# Patient Record
Sex: MALE | NOT HISPANIC OR LATINO | Employment: OTHER | URBAN - NONMETROPOLITAN AREA
[De-identification: names, ages, dates, MRNs, and addresses within clinical notes are randomized per-mention and may not be internally consistent; named-entity substitution may affect disease eponyms.]

---

## 2019-10-15 ENCOUNTER — HOSPITAL ENCOUNTER (OUTPATIENT)
Facility: HOSPITAL | Age: 69
Setting detail: HOSPITAL OUTPATIENT SURGERY
End: 2019-10-15
Attending: INTERNAL MEDICINE | Admitting: INTERNAL MEDICINE

## 2022-03-08 ENCOUNTER — OFFICE VISIT (OUTPATIENT)
Dept: SLEEP MEDICINE | Facility: HOSPITAL | Age: 72
End: 2022-03-08

## 2022-03-08 VITALS
HEART RATE: 66 BPM | HEIGHT: 67 IN | WEIGHT: 190 LBS | BODY MASS INDEX: 29.82 KG/M2 | DIASTOLIC BLOOD PRESSURE: 87 MMHG | SYSTOLIC BLOOD PRESSURE: 143 MMHG | OXYGEN SATURATION: 94 %

## 2022-03-08 DIAGNOSIS — R06.83 SNORING: Primary | ICD-10-CM

## 2022-03-08 DIAGNOSIS — G47.00 FREQUENT NOCTURNAL AWAKENING: ICD-10-CM

## 2022-03-08 DIAGNOSIS — G47.19 EXCESSIVE DAYTIME SLEEPINESS: ICD-10-CM

## 2022-03-08 PROCEDURE — 99203 OFFICE O/P NEW LOW 30 MIN: CPT | Performed by: NURSE PRACTITIONER

## 2022-03-08 NOTE — PROGRESS NOTES
New Patient Sleep Medicine Consultation    Encounter Date: 3/8/2022         Patient's Primary Care Provider: Provider, No Known  Referring Provider: No ref. provider found  Reason for consultation/chief complaint: snoring, awakening gasping for breath, witnessed apneas, excessive daytime sleepiness and unrefreshing sleep    Tano Brady is a 71 y.o. male who admits to snoring, unrestful sleep, high blood pressure, gasping during sleep, decreased libido, excessive daytime sleepiness, stopping breathing during sleep, frequent unexplained arousals, sleeping less than 6 hours per night, disturbed or restless sleep, up to bathroom at night, sleepy driving, restless legs at night, difficulty staying asleep and taking medicine to help go to sleep.    He denies cataplexy, sleep paralysis, or hypnagogic hallucinations. His bedtime is ~ 6134-1733. He  falls asleep after 5-10 minutes, and is up 4-5 times per night. He wakes up ~ 0500. He endorses 3-4 hours of sleep. He drinks 3-4 cups of coffee, 0 teas, and 0 sodas per day. He drinks 0 alcoholic beverages per week. He is not a current smoker. He does not take sedatives or hypnotics. He has no sleepiness with driving. He naps most days when unstimulated.   Patient was previously on CPAP for at least 5 years, using a full face mask with an autoCPAP, and stopped using his machine due to pressure intolerance. He states that the pressure ramped up too high at night and would blow out of the sides of his mask.    *[facility] is a [place] [facility] if he requalifies for [facility]AP therapy.    Maria Stein - 17    Prior Sleep Testin. HST >10 years ago, unsure of AHI  2. Currently not on therapy for 3-4 years      Past Medical History:   Diagnosis Date   • Hypertension       *Takes amlodipine, HCTZ, and daily antihistamine but unsure of doses    Social History     Socioeconomic History   • Marital status: Unknown   Tobacco Use   • Smoking status: Former Smoker      "Packs/day: 3.00     Years: 22.00     Pack years: 66.00     Types: Cigarettes     Family History   Problem Relation Age of Onset   • Cancer Mother    • Cancer Father      Prior T&A, UPPP, maxillofacial, or bariatric surgery: Nasal septoplasty, sinus surgery  Family history of sleep disorders: None  Other family history + for: None  Occupation: Former , semi-retired from [employer] work  Marital status:   Children: 5  Has 6 brothers and 5 sisters  Smoking history: smoked 3 ppds from age 16 until 38      Review of Systems:  Constitutional: positive for fatigue  Eyes: negative  Ears, nose, mouth, throat, and face: positive for snoring  Respiratory: negative  Cardiovascular: negative  Gastrointestinal: negative  Genitourinary:negative  Integument/breast: negative  Hematologic/lymphatic: negative  Musculoskeletal:negative  Neurological: negative  Behavioral/Psych: positive for fatigue and sleep disturbance  Endocrine: negative  Allergic/Immunologic: negative   Patient advised to discuss any positive ROS with PCP.      Vitals:    03/08/22 1435   BP: 143/87   Pulse: 66   SpO2: 94%           03/08/22  1435   Weight: 86.2 kg (190 lb)       Body mass index is 30.21 kg/m². Patient's Body mass index is 30.21 kg/m². indicating that he is obese (BMI >30). Obesity-related health conditions include the following: obstructive sleep apnea and hypertension. Obesity is unchanged. BMI is is above average; BMI management plan is completed. We discussed portion control and increasing exercise..      Tobacco Use: Medium Risk   • Smoking Tobacco Use: Former Smoker   • Smokeless Tobacco Use: Unknown       Physical Exam:        General: Alert. Cooperative. Well developed. No acute distress.   Head/Neck:  Normocephalic. Symmetrical. Atraumatic.     Neck circumference: 18\"             Eyes: Sclera clear. No icterus. PERRLA. Normal EOM.             Ears: No deformities. Normal hearing.             Nose: No septal deviation. No " drainage.          Throat: No oral lesions. No thrush. Moist mucous membranes. Trachea midline    Tongue is normal     Dentition is fair       Pharynx: Posterior pharyngeal pillars are narrow    Mallampati score of III (soft and hard palate and base of uvula visible)    Pharynx is nonerythematous, with both tonsils mildly enlarged; very low-lying soft palate   Chest Wall:  Normal shape. Symmetric expansion with respiration. No tenderness.          Lungs:  Clear to auscultation bilaterally. No wheezes. No rhonchi. No rales. Respirations regular, even and unlabored.            Heart:  Regular rhythm and normal rate. Normal S1 and S2. No murmur.     Abdomen:  Soft, non-tender and non-distended. Normal bowel sounds. No masses.  Extremities:  Moves all extremities well. No edema.           Pulses: Pulses palpable and equal bilaterally.               Skin: Dry. Intact. No bleeding. No rash.           Neuro: Moves all 4 extremities and cranial nerves grossly intact.  Psychiatric: Normal mood and affect.    No current outpatient medications on file.     No results found for: WBC, RBC, HGB, HCT, MCV, MCH, MCHC, RDW, RDWSD, MPV, PLT, NEUTRORELPCT, LYMPHORELPCT, MONORELPCT, EOSRELPCT, BASORELPCT, AUTOIGPER, NEUTROABS, LYMPHSABS, MONOSABS, EOSABS, BASOSABS, AUTOIGNUM, NRBC, IRON, FERRITIN  No results found for: GLUCOSE, BUN, CREATININE, EGFRIFNONA, EGFRIFAFRI, BCR, K, CO2, CALCIUM, PROTENTOTREF, ALBUMIN, LABIL2, BILIRUBIN, AST, ALT    Contraindications to home sleep test: none    ASSESSMENT:  1. Excessive daytime sleepiness, presumed obstructive sleep apnea - New (to me), additional work-up planned (4)  1. Check home sleep study   2. Follow up for results  2. Snoring, presumed obstructive sleep apnea - New (to me), additional work-up planned (4)  1. As above  3. Frequent nocturnal awakenings - New (to me), additional work-up planned (4)  1. As above      I spent 30 minutes caring for Freedman on this date of service. This time  includes time spent by me in the following activities: preparing for the visit, reviewing tests, obtaining and/or reviewing a separately obtained history, performing a medically appropriate examination and/or evaluation , counseling and educating the patient/family/caregiver, ordering medications, tests, or procedures, documenting information in the medical record and care coordination; discussing PAP therapy and Further testing    RTC 2 weeks after testing. Patient agrees to return sooner if changes in symptoms.         This document has been electronically signed by JAVIER Peguero on March 8, 2022 15:02 CST          CC: Provider, No Known          No ref. provider found

## 2022-03-17 ENCOUNTER — HOSPITAL ENCOUNTER (OUTPATIENT)
Dept: SLEEP MEDICINE | Facility: HOSPITAL | Age: 72
Discharge: HOME | End: 2022-03-17
Admitting: NURSE PRACTITIONER

## 2022-03-17 DIAGNOSIS — G47.19 EXCESSIVE DAYTIME SLEEPINESS: ICD-10-CM

## 2022-03-17 DIAGNOSIS — R06.83 SNORING: ICD-10-CM

## 2022-03-17 DIAGNOSIS — G47.00 FREQUENT NOCTURNAL AWAKENING: ICD-10-CM

## 2022-03-17 PROCEDURE — 95800 SLP STDY UNATTENDED: CPT

## 2022-03-17 PROCEDURE — 95800 SLP STDY UNATTENDED: CPT | Performed by: PSYCHIATRY & NEUROLOGY

## 2022-03-31 ENCOUNTER — OFFICE VISIT (OUTPATIENT)
Dept: SLEEP MEDICINE | Facility: HOSPITAL | Age: 72
End: 2022-03-31

## 2022-03-31 VITALS
OXYGEN SATURATION: 96 % | HEART RATE: 84 BPM | DIASTOLIC BLOOD PRESSURE: 88 MMHG | SYSTOLIC BLOOD PRESSURE: 150 MMHG | WEIGHT: 190 LBS | BODY MASS INDEX: 30.53 KG/M2 | HEIGHT: 66 IN

## 2022-03-31 DIAGNOSIS — G47.33 OBSTRUCTIVE SLEEP APNEA, ADULT: Primary | ICD-10-CM

## 2022-03-31 PROCEDURE — 99212 OFFICE O/P EST SF 10 MIN: CPT | Performed by: NURSE PRACTITIONER

## 2022-03-31 RX ORDER — AMLODIPINE BESYLATE 10 MG/1
TABLET ORAL
COMMUNITY

## 2022-03-31 NOTE — PROGRESS NOTES
Sleep Clinic Follow Up - Sleep Study Results    CHIEF COMPLAINT: follow up sleep testing    LAST OV: 03/08/2022    HPI:  The patient is a 71 y.o. male.  I discussed the results of the recent home sleep study performed on 03/17/2022.  The AHI/SIMON was 16. Pulse range of 54-94 bpm. O2 nicole of 83% with no sustained hypoxia.      INTERVAL MEDICAL HISTORY: No change since last office visit in regard to the patient's bedtime routine, medications, or diagnosis.      Review of Systems:  Denies chest pain, shortness of breath, leg swelling, cough, fever, chills, abdominal pain, N/V/D.    MEDICATIONS:   Current Outpatient Medications:   •  amLODIPine (NORVASC) 10 MG tablet, amlodipine 10 mg tablet  Take 1 tablet every day by oral route., Disp: , Rfl:     PHYSICAL EXAM:    Vitals:    03/31/22 1334   BP: 150/88   Pulse: 84   SpO2: 96%     Patient's Body mass index is 30.21 kg/m². indicating that he is obese (BMI >30). Obesity-related health conditions include the following: obstructive sleep apnea and hypertension. Obesity is unchanged. BMI is is above average; BMI management plan is completed. I recommend portion control and increasing exercise.      Gen:                No distress, conversant, pleasant, appears stated age, alert, oriented  Eyes:               Anicteric sclera, moist conjunctiva, no lid lag                           PERRL, EOMI   Heent:             NC/AT                          Oropharynx clear                          Normal hearing  Lungs:             Normal effort, non-labored breathing                          Clear to auscultation bilaterally          CV:                  Normal S1/S2, no murmur                          No lower extremity edema  ABD:               Soft, rounded, non-distended                          Normal bowel sounds                    Psych:             Appropriate affect  Neuro:             CN 2-12 appear intact      Assessment and Plan:    1. Obstructive sleep apnea - Established,  not yet controlled  1. The sleep study results were discussed in detail with the patient. The risks of untreated sleep apnea were reviewed. Treatment options for sleep apnea were discussed. I counseled patient on sleep hygiene, including regular sleep wake schedule and stimulus control therapy, the importance of weight reduction, and abstaining from smoking and alcohol consumption  2. Proceed with autoCPAP 5-15 cm H2O with mask per patient choice - patient is interested in trying ResMed AirFit F30  3. [facility]  4. Follow up in 30-90 days with compliance report, or sooner if trouble with PAP adaptation      All of the patient's questions were answered. He states understanding and agreement with my assessment and plan as above.     I spent 15 minutes caring for Jose Antonio on this date of service. This time includes time spent by me in the following activities: preparing for the visit, reviewing tests, obtaining and/or reviewing a separately obtained history, performing a medically appropriate examination and/or evaluation , counseling and educating the patient/family/caregiver, documenting information in the medical record and care coordination; discussing PAP therapy, PAP compliance, PAP maintenance and Study results    Patient to follow up in 31-90 days with compliance report. Patient agrees to return sooner if changes in symptoms.         This document has been electronically signed by JAVIER Peguero on March 31, 2022 13:41 CDT            CC: Provider, No Known          No ref. provider found